# Patient Record
Sex: FEMALE | Race: WHITE
[De-identification: names, ages, dates, MRNs, and addresses within clinical notes are randomized per-mention and may not be internally consistent; named-entity substitution may affect disease eponyms.]

---

## 2018-06-19 ENCOUNTER — HOSPITAL ENCOUNTER (OUTPATIENT)
Dept: HOSPITAL 95 - LAB | Age: 28
Discharge: HOME | End: 2018-06-19
Attending: NURSE PRACTITIONER
Payer: COMMERCIAL

## 2018-06-19 DIAGNOSIS — N39.0: Primary | ICD-10-CM

## 2018-06-19 DIAGNOSIS — R30.0: ICD-10-CM

## 2019-02-28 ENCOUNTER — HOSPITAL ENCOUNTER (OUTPATIENT)
Dept: HOSPITAL 95 - LAB | Age: 29
Discharge: HOME | End: 2019-02-28
Attending: OBSTETRICS & GYNECOLOGY
Payer: COMMERCIAL

## 2019-02-28 DIAGNOSIS — Z34.83: Primary | ICD-10-CM

## 2019-02-28 DIAGNOSIS — Z3A.32: ICD-10-CM

## 2019-02-28 LAB
ALBUMIN SERPL BCP-MCNC: 2.7 G/DL (ref 3.4–5)
ALBUMIN/GLOB SERPL: 0.6 {RATIO} (ref 0.8–1.8)
ALT SERPL W P-5'-P-CCNC: 17 U/L (ref 12–78)
ANION GAP SERPL CALCULATED.4IONS-SCNC: 5 MMOL/L (ref 6–16)
AST SERPL W P-5'-P-CCNC: 14 U/L (ref 12–37)
BASOPHILS # BLD AUTO: 0.03 K/MM3 (ref 0–0.23)
BASOPHILS NFR BLD AUTO: 0 % (ref 0–2)
BILIRUB SERPL-MCNC: 0.2 MG/DL (ref 0.1–1)
BUN SERPL-MCNC: 7 MG/DL (ref 8–24)
CALCIUM SERPL-MCNC: 8.8 MG/DL (ref 8.5–10.1)
CHLORIDE SERPL-SCNC: 109 MMOL/L (ref 98–108)
CO2 SERPL-SCNC: 26 MMOL/L (ref 21–32)
CREAT SERPL-MCNC: 0.53 MG/DL (ref 0.4–1)
CREAT UR-MCNC: 195 MG/DL (ref 27–270)
DEPRECATED RDW RBC AUTO: 41.5 FL (ref 35.1–46.3)
EOSINOPHIL # BLD AUTO: 0.07 K/MM3 (ref 0–0.68)
EOSINOPHIL NFR BLD AUTO: 1 % (ref 0–6)
ERYTHROCYTE [DISTWIDTH] IN BLOOD BY AUTOMATED COUNT: 12.4 % (ref 11.7–14.2)
GLOBULIN SER CALC-MCNC: 4.3 G/DL (ref 2.2–4)
GLUCOSE SERPL-MCNC: 78 MG/DL (ref 70–99)
HCT VFR BLD AUTO: 38.3 % (ref 33–51)
HGB BLD-MCNC: 12.2 G/DL (ref 11.5–16)
IMM GRANULOCYTES # BLD AUTO: 0.08 K/MM3 (ref 0–0.1)
IMM GRANULOCYTES NFR BLD AUTO: 1 % (ref 0–1)
LYMPHOCYTES # BLD AUTO: 1.49 K/MM3 (ref 0.84–5.2)
LYMPHOCYTES NFR BLD AUTO: 19 % (ref 21–46)
MCHC RBC AUTO-ENTMCNC: 31.9 G/DL (ref 31.5–36.5)
MCV RBC AUTO: 92 FL (ref 80–100)
MONOCYTES # BLD AUTO: 0.61 K/MM3 (ref 0.16–1.47)
MONOCYTES NFR BLD AUTO: 8 % (ref 4–13)
NEUTROPHILS # BLD AUTO: 5.72 K/MM3 (ref 1.96–9.15)
NEUTROPHILS NFR BLD AUTO: 72 % (ref 41–73)
NRBC # BLD AUTO: 0 K/MM3 (ref 0–0.02)
NRBC BLD AUTO-RTO: 0 /100 WBC (ref 0–0.2)
PLATELET # BLD AUTO: 186 K/MM3 (ref 150–400)
POTASSIUM SERPL-SCNC: 3.6 MMOL/L (ref 3.5–5.5)
PROT SERPL-MCNC: 7 G/DL (ref 6.4–8.2)
PROT UR-MCNC: 17.6 MG/DL (ref 0–11.9)
PROT/CREAT UR: 0.1 MG/G{CREAT}
SODIUM SERPL-SCNC: 140 MMOL/L (ref 136–145)

## 2019-04-01 ENCOUNTER — HOSPITAL ENCOUNTER (OUTPATIENT)
Dept: HOSPITAL 95 - LAB SHORT | Age: 29
Discharge: HOME | End: 2019-04-01
Attending: OBSTETRICS & GYNECOLOGY
Payer: COMMERCIAL

## 2019-04-01 DIAGNOSIS — Z34.93: Primary | ICD-10-CM

## 2019-04-01 DIAGNOSIS — Z3A.37: ICD-10-CM

## 2019-04-11 ENCOUNTER — HOSPITAL ENCOUNTER (INPATIENT)
Dept: HOSPITAL 95 - BC | Age: 29
LOS: 4 days | Discharge: HOME | End: 2019-04-15
Attending: OBSTETRICS & GYNECOLOGY | Admitting: OBSTETRICS & GYNECOLOGY
Payer: COMMERCIAL

## 2019-04-11 VITALS — WEIGHT: 0.37 LBS | HEIGHT: 65.98 IN | BODY MASS INDEX: 0.06 KG/M2

## 2019-04-11 DIAGNOSIS — Z3A.38: ICD-10-CM

## 2019-04-11 LAB
ALBUMIN SERPL BCP-MCNC: 2.7 G/DL
ALBUMIN/GLOB SERPL: 0.6 {RATIO}
ALT SERPL W P-5'-P-CCNC: 18 U/L
ANION GAP SERPL CALCULATED.4IONS-SCNC: 7 MMOL/L
AST SERPL W P-5'-P-CCNC: 16 U/L
BASOPHILS # BLD AUTO: 0.03 K/MM3
BASOPHILS NFR BLD AUTO: 0 %
BILIRUB SERPL-MCNC: 0.2 MG/DL
BUN SERPL-MCNC: 8 MG/DL
CALCIUM SERPL-MCNC: 8.9 MG/DL
CHLORIDE SERPL-SCNC: 108 MMOL/L
CO2 SERPL-SCNC: 25 MMOL/L
CREAT SERPL-MCNC: 0.61 MG/DL
DEPRECATED RDW RBC AUTO: 43.4 FL
EOSINOPHIL # BLD AUTO: 0.06 K/MM3
EOSINOPHIL NFR BLD AUTO: 1 %
ERYTHROCYTE [DISTWIDTH] IN BLOOD BY AUTOMATED COUNT: 13.1 %
GLOBULIN SER CALC-MCNC: 4.2 G/DL
GLUCOSE SERPL-MCNC: 85 MG/DL
HCT VFR BLD AUTO: 38.1 %
HGB BLD-MCNC: 12.2 G/DL
IMM GRANULOCYTES # BLD AUTO: 0.1 K/MM3
IMM GRANULOCYTES NFR BLD AUTO: 1 %
LYMPHOCYTES # BLD AUTO: 1.9 K/MM3
LYMPHOCYTES NFR BLD AUTO: 21 %
MCHC RBC AUTO-ENTMCNC: 32 G/DL
MCV RBC AUTO: 90 FL
MONOCYTES # BLD AUTO: 0.84 K/MM3
MONOCYTES NFR BLD AUTO: 10 %
NEUTROPHILS # BLD AUTO: 5.93 K/MM3
NEUTROPHILS NFR BLD AUTO: 67 %
NRBC # BLD AUTO: 0 K/MM3
NRBC BLD AUTO-RTO: 0 /100 WBC
PLATELET # BLD AUTO: 173 K/MM3
POTASSIUM SERPL-SCNC: 3.8 MMOL/L
PROT SERPL-MCNC: 6.9 G/DL
SODIUM SERPL-SCNC: 140 MMOL/L

## 2019-04-12 LAB
CREAT UR-MCNC: 157 MG/DL
PROT UR-MCNC: 15.6 MG/DL
PROT/CREAT UR: 0.1 MG/G{CREAT}

## 2019-04-14 PROCEDURE — 3E0P7VZ INTRODUCTION OF HORMONE INTO FEMALE REPRODUCTIVE, VIA NATURAL OR ARTIFICIAL OPENING: ICD-10-PCS | Performed by: OBSTETRICS & GYNECOLOGY

## 2019-04-14 PROCEDURE — 3E033VJ INTRODUCTION OF OTHER HORMONE INTO PERIPHERAL VEIN, PERCUTANEOUS APPROACH: ICD-10-PCS | Performed by: OBSTETRICS & GYNECOLOGY

## 2019-04-15 LAB
BASOPHILS # BLD AUTO: 0.07 K/MM3
BASOPHILS NFR BLD AUTO: 1 %
DEPRECATED RDW RBC AUTO: 45.1 FL
EOSINOPHIL # BLD AUTO: 0.11 K/MM3
EOSINOPHIL NFR BLD AUTO: 1 %
ERYTHROCYTE [DISTWIDTH] IN BLOOD BY AUTOMATED COUNT: 13.4 %
HCT VFR BLD AUTO: 34.3 %
HGB BLD-MCNC: 10.8 G/DL
IMM GRANULOCYTES # BLD AUTO: 0.08 K/MM3
IMM GRANULOCYTES NFR BLD AUTO: 1 %
LYMPHOCYTES # BLD AUTO: 2.25 K/MM3
LYMPHOCYTES NFR BLD AUTO: 23 %
MCHC RBC AUTO-ENTMCNC: 31.5 G/DL
MCV RBC AUTO: 92 FL
MONOCYTES # BLD AUTO: 0.71 K/MM3
MONOCYTES NFR BLD AUTO: 7 %
NEUTROPHILS # BLD AUTO: 6.39 K/MM3
NEUTROPHILS NFR BLD AUTO: 67 %
NRBC # BLD AUTO: 0 K/MM3
NRBC BLD AUTO-RTO: 0 /100 WBC
PLATELET # BLD AUTO: 170 K/MM3

## 2019-04-15 NOTE — NUR
DISCHARGE INSTRUCTIONS SIGNED. ALL QUESTIONS ANSWERED. BANDS MATCHED. PT
PUTTING INFANT INTO CARSEAT AND DISCHARGING TO HOME.